# Patient Record
Sex: FEMALE | Race: WHITE | URBAN - METROPOLITAN AREA
[De-identification: names, ages, dates, MRNs, and addresses within clinical notes are randomized per-mention and may not be internally consistent; named-entity substitution may affect disease eponyms.]

---

## 2017-03-29 ENCOUNTER — OFFICE VISIT (OUTPATIENT)
Dept: FAMILY MEDICINE CLINIC | Age: 32
End: 2017-03-29

## 2017-03-29 VITALS
BODY MASS INDEX: 28.61 KG/M2 | HEIGHT: 66 IN | RESPIRATION RATE: 17 BRPM | TEMPERATURE: 96.9 F | WEIGHT: 178 LBS | OXYGEN SATURATION: 98 % | SYSTOLIC BLOOD PRESSURE: 124 MMHG | HEART RATE: 82 BPM | DIASTOLIC BLOOD PRESSURE: 90 MMHG

## 2017-03-29 DIAGNOSIS — Z91.030 BEE STING ALLERGY: Primary | ICD-10-CM

## 2017-03-29 RX ORDER — EPINEPHRINE 0.3 MG/.3ML
0.3 INJECTION SUBCUTANEOUS
Qty: 2 SYRINGE | Refills: 0 | Status: SHIPPED | OUTPATIENT
Start: 2017-03-29

## 2017-03-29 RX ORDER — EPINEPHRINE 0.3 MG/.3ML
0.3 INJECTION SUBCUTANEOUS
COMMUNITY
End: 2017-03-29 | Stop reason: SDUPTHER

## 2017-03-29 NOTE — PROGRESS NOTES
HISTORY OF PRESENT ILLNESS  Noah Rogers is a 28 y.o. female. HPI Comments: Pt comes in today for epipen refill. She says that she has a bad bee sting allergy and her epipen is . She says that she is pregnant and due this weekend. She says that this is her first baby. She denies fever, chills, sweats, N/V, chest pain, SOB, dizziness. Establish Care   Pertinent negatives include no chest pain, no abdominal pain, no headaches and no shortness of breath. Review of Systems   Constitutional: Negative for chills, diaphoresis, fever and malaise/fatigue. HENT: Negative for congestion, ear pain, hearing loss, nosebleeds and sore throat. Eyes: Negative for blurred vision, double vision, pain and redness. Respiratory: Negative for cough, hemoptysis, sputum production, shortness of breath and wheezing. Cardiovascular: Negative for chest pain, palpitations and leg swelling. Gastrointestinal: Negative for abdominal pain, blood in stool, constipation, diarrhea, nausea and vomiting. Genitourinary: Negative for dysuria and hematuria. Musculoskeletal: Negative for back pain, myalgias and neck pain. Skin: Negative for rash. Neurological: Negative for dizziness, tingling, sensory change, seizures, loss of consciousness and headaches. Endo/Heme/Allergies: Does not bruise/bleed easily. Psychiatric/Behavioral: Negative for depression, memory loss and substance abuse. The patient is not nervous/anxious. History reviewed. No pertinent past medical history. Family History   Problem Relation Age of Onset    Elevated Lipids Mother     No Known Problems Father        Past Surgical History:   Procedure Laterality Date    HX HERNIA REPAIR         Social History     Social History    Marital status: UNKNOWN     Spouse name: N/A    Number of children: N/A    Years of education: N/A     Occupational History    Not on file.      Social History Main Topics    Smoking status: Never Smoker    Smokeless tobacco: Never Used    Alcohol use No    Drug use: No    Sexual activity: Yes     Partners: Male     Other Topics Concern    Not on file     Social History Narrative    No narrative on file       Current Outpatient Prescriptions   Medication Sig Dispense Refill    EPINEPHrine (EPIPEN) 0.3 mg/0.3 mL injection 0.3 mL by IntraMUSCular route once as needed. 2 Syringe 0       Allergies   Allergen Reactions    Bee Sting [Sting, Bee] Swelling    Shellfish Derived Itching       Visit Vitals    /90 (BP 1 Location: Right arm, BP Patient Position: Sitting)    Pulse 82    Temp 96.9 °F (36.1 °C) (Oral)    Resp 17    Ht 5' 6\" (1.676 m)    Wt 178 lb (80.7 kg)    SpO2 98%    BMI 28.73 kg/m2       Physical Exam   Constitutional: She is oriented to person, place, and time. She appears well-developed and well-nourished. No distress. HENT:   Head: Normocephalic and atraumatic. Right Ear: External ear normal.   Left Ear: External ear normal.   Nose: Nose normal.   Mouth/Throat: Oropharynx is clear and moist. No oropharyngeal exudate. Eyes: Conjunctivae are normal. Pupils are equal, round, and reactive to light. Right eye exhibits no discharge. Left eye exhibits no discharge. Neck: Normal range of motion. Neck supple. No tracheal deviation present. No thyromegaly present. Cardiovascular: Normal rate, regular rhythm and normal heart sounds. Exam reveals no gallop and no friction rub. No murmur heard. Pulmonary/Chest: Effort normal and breath sounds normal. No respiratory distress. She has no wheezes. She has no rales. Abdominal: Soft. She exhibits no distension. There is no tenderness. Musculoskeletal: Normal range of motion. She exhibits no edema or tenderness. Lymphadenopathy:     She has no cervical adenopathy. Neurological: She is alert and oriented to person, place, and time. Coordination normal.   Skin: Skin is warm and dry. No rash noted. She is not diaphoretic. No erythema. Psychiatric: She has a normal mood and affect. Her behavior is normal. Judgment and thought content normal.       ASSESSMENT and PLAN  1. Bee sting allergy  - EPINEPHrine (EPIPEN) 0.3 mg/0.3 mL injection; 0.3 mL by IntraMUSCular route once as needed. Dispense: 2 Syringe; Refill: 0  - follow up as needed    Plan and reference materials were reviewed with the patient and the patient expressed understanding. Patient instructed that if symptoms/condition worsens or fails to resolve to come back to the office or go to the emergency room.     Yair Chiu

## 2017-03-29 NOTE — MR AVS SNAPSHOT
Visit Information Date & Time Provider Department Dept. Phone Encounter #  
 3/29/2017  3:30 PM Rosy Cooper, [address] [facility] [phone] [id number] Follow-up Instructions Return if symptoms worsen or fail to improve. Upcoming Health Maintenance Date Due DTaP/Tdap/Td series (1 - Tdap) 4/10/2007 PAP AKA CERVICAL CYTOLOGY 4/10/2007 INFLUENZA AGE 9 TO ADULT 2016 Allergies as of 3/29/2017  Review Complete On: 3/29/2017 By: STEPHANIE Hampton Severity Noted Reaction Type Reactions Bee Sting [Sting, Bee]  2017    Swelling Shellfish Derived  2017    Itching Current Immunizations  Never Reviewed No immunizations on file. Not reviewed this visit You Were Diagnosed With   
  
 Codes Comments Bee sting allergy    -  Primary ICD-10-CM: Z91.038 
ICD-9-CM: V15.06 Vitals BP Pulse Temp Resp Height(growth percentile) Weight(growth percentile) 124/90 (BP 1 Location: Right arm, BP Patient Position: Sitting) 82 96.9 °F (36.1 °C) (Oral) 17 5' 6\" (1.676 m) 178 lb (80.7 kg) SpO2 BMI OB Status Smoking Status 98% 28.73 kg/m2 Pregnant Never Smoker Vitals History BMI and BSA Data Body Mass Index Body Surface Area 28.73 kg/m 2 1.94 m 2 Preferred Pharmacy Pharmacy Name Phone [address], [address] [facility] [place] [phone] Your Updated Medication List  
  
   
This list is accurate as of: 3/29/17  4:08 PM.  Always use your most recent med list.  
  
  
  
  
 EPINEPHrine 0.3 mg/0.3 mL injection Commonly known as:  EPIPEN  
0.3 mL by IntraMUSCular route once as needed. Prescriptions Sent to Pharmacy Refills EPINEPHrine (EPIPEN) 0.3 mg/0.3 mL injection 0 Si.3 mL by IntraMUSCular route once as needed.   
 Class: Normal  
 Pharmacy: [facility] [address] [place] [address] [address] [place] [address] #: [phone] Route: IntraMUSCular Follow-up Instructions Return if symptoms worsen or fail to improve. Patient Instructions Allergic Reaction: Care Instructions Your Care Instructions An allergic reaction is an excessive response from your immune system to a medicine, chemical, food, insect bite, or other substance. A reaction can range from mild to life-threatening. Some people have a mild rash, hives, and itching or stomach cramps. In severe reactions, swelling of your tongue and throat can close up your airway so that you cannot breathe. Follow-up care is a key part of your treatment and safety. Be sure to make and go to all appointments, and call your doctor if you are having problems. It's also a good idea to know your test results and keep a list of the medicines you take. How can you care for yourself at home? · If you know what caused your allergic reaction, be sure to avoid it. Your allergy may become more severe each time you have a reaction. · Take an over-the-counter antihistamine, such as cetirizine (Zyrtec) or loratadine (Claritin), to treat mild symptoms. Read and follow directions on the label. Some antihistamines can make you feel sleepy. Do not give antihistamines to a child unless you have checked with your doctor first. Mild symptoms include sneezing or an itchy or runny nose; an itchy mouth; a few hives or mild itching; and mild nausea or stomach discomfort. · Do not scratch hives or a rash. Put a cold, moist towel on them or take cool baths to relieve itching. Put ice packs on hives, swelling, or insect stings for 10 to 15 minutes at a time. Put a thin cloth between the ice pack and your skin. Do not take hot baths or showers. They will make the itching worse.  
· Your doctor may prescribe a shot of epinephrine to carry with you in case you have a severe reaction. Learn how to give yourself the shot and keep it with you at all times. Make sure it is not . · Go to the emergency room every time you have a severe reaction, even if you have used your shot of epinephrine and are feeling better. Symptoms can come back after a shot. · Wear medical alert jewelry that lists your allergies. You can buy this at most drugstores. · If your child has a severe allergy, make sure that his or her teachers, babysitters, coaches, and other caregivers know about the allergy. They should have an epinephrine shot, know how and when to give it, and have a plan to take your child to the hospital. 
When should you call for help? Give an epinephrine shot if: 
· You think you are having a severe allergic reaction. · You have symptoms in more than one body area, such as mild nausea and an itchy mouth. After giving an epinephrine shot call 911, even if you feel better. Call 911 if: 
· You have symptoms of a severe allergic reaction. These may include: 
¨ Sudden raised, red areas (hives) all over your body. ¨ Swelling of the throat, mouth, lips, or tongue. ¨ Trouble breathing. ¨ Passing out (losing consciousness). Or you may feel very lightheaded or suddenly feel weak, confused, or restless. · You have been given an epinephrine shot, even if you feel better. Call your doctor now or seek immediate medical care if: 
· You have symptoms of an allergic reaction, such as: ¨ A rash or hives (raised, red areas on the skin). ¨ Itching. ¨ Swelling. ¨ Belly pain, nausea, or vomiting. Watch closely for changes in your health, and be sure to contact your doctor if: 
· You do not get better as expected. Where can you learn more? Go to http://kim-vicente.info/. Enter T907 in the search box to learn more about \"Allergic Reaction: Care Instructions. \" Current as of: 2016 Content Version: 11.2 © 1357-3390 [employer]. Care instructions adapted under license by [employer] (which disclaims liability or warranty for this information). If you have questions about a medical condition or this instruction, always ask your healthcare professional. [address] any warranty or liability for your use of this information. Introducing [facility]! Cynthia Darby introduces [employer] patient portal. Now you can access parts of your medical record, email your doctor's office, and request medication refills online. 1. In your internet browser, go to [email or web address] 2. Click on the First Time User? Click Here link in the Sign In box. You will see the New Member Sign Up page. 3. Enter your [employer] Access Code exactly as it appears below. You will not need to use this code after youve completed the sign-up process. If you do not sign up before the expiration date, you must request a new code. · [employer] Access Code: [email or web address] Expires: 6/27/2017  4:08 PM 
 
4. Enter the last four digits of your Social Security Number (xxxx) and Date of Birth (mm/dd/yyyy) as indicated and click Submit. You will be taken to the next sign-up page. 5. Create a [employer] ID. This will be your [employer] login ID and cannot be changed, so think of one that is secure and easy to remember. 6. Create a [employer] password. You can change your password at any time. 7. Enter your Password Reset Question and Answer. This can be used at a later time if you forget your password. 8. Enter your e-mail address. You will receive e-mail notification when new information is available in [address] 9. Click Sign Up. You can now view and download portions of your medical record. 10. Click the Download Summary menu link to download a portable copy of your medical information.  
 
If you have questions, please visit the Frequently Asked Questions section of the [employer]. Remember, [employer]et[employer]art is NOT to be used for urgent needs. For medical emergencies, dial 911. Now available from your iPhone and Android! Please provide this summary of care documentation to your next provider. Your primary care clinician is listed as Kolton Foley. If you have any questions after today's visit, please call [phone].

## 2017-03-29 NOTE — PATIENT INSTRUCTIONS
Allergic Reaction: Care Instructions  Your Care Instructions  An allergic reaction is an excessive response from your immune system to a medicine, chemical, food, insect bite, or other substance. A reaction can range from mild to life-threatening. Some people have a mild rash, hives, and itching or stomach cramps. In severe reactions, swelling of your tongue and throat can close up your airway so that you cannot breathe. Follow-up care is a key part of your treatment and safety. Be sure to make and go to all appointments, and call your doctor if you are having problems. It's also a good idea to know your test results and keep a list of the medicines you take. How can you care for yourself at home? · If you know what caused your allergic reaction, be sure to avoid it. Your allergy may become more severe each time you have a reaction. · Take an over-the-counter antihistamine, such as cetirizine (Zyrtec) or loratadine (Claritin), to treat mild symptoms. Read and follow directions on the label. Some antihistamines can make you feel sleepy. Do not give antihistamines to a child unless you have checked with your doctor first. Mild symptoms include sneezing or an itchy or runny nose; an itchy mouth; a few hives or mild itching; and mild nausea or stomach discomfort. · Do not scratch hives or a rash. Put a cold, moist towel on them or take cool baths to relieve itching. Put ice packs on hives, swelling, or insect stings for 10 to 15 minutes at a time. Put a thin cloth between the ice pack and your skin. Do not take hot baths or showers. They will make the itching worse. · Your doctor may prescribe a shot of epinephrine to carry with you in case you have a severe reaction. Learn how to give yourself the shot and keep it with you at all times. Make sure it is not . · Go to the emergency room every time you have a severe reaction, even if you have used your shot of epinephrine and are feeling better. Symptoms can come back after a shot. · Wear medical alert jewelry that lists your allergies. You can buy this at most drugstores. · If your child has a severe allergy, make sure that his or her teachers, babysitters, coaches, and other caregivers know about the allergy. They should have an epinephrine shot, know how and when to give it, and have a plan to take your child to the hospital.  When should you call for help? Give an epinephrine shot if:  · You think you are having a severe allergic reaction. · You have symptoms in more than one body area, such as mild nausea and an itchy mouth. After giving an epinephrine shot call 911, even if you feel better. Call 911 if:  · You have symptoms of a severe allergic reaction. These may include:  ¨ Sudden raised, red areas (hives) all over your body. ¨ Swelling of the throat, mouth, lips, or tongue. ¨ Trouble breathing. ¨ Passing out (losing consciousness). Or you may feel very lightheaded or suddenly feel weak, confused, or restless. · You have been given an epinephrine shot, even if you feel better. Call your doctor now or seek immediate medical care if:  · You have symptoms of an allergic reaction, such as:  ¨ A rash or hives (raised, red areas on the skin). ¨ Itching. ¨ Swelling. ¨ Belly pain, nausea, or vomiting. Watch closely for changes in your health, and be sure to contact your doctor if:  · You do not get better as expected. Where can you learn more? Go to http://kim-vicente.info/. Enter Z508 in the search box to learn more about \"Allergic Reaction: Care Instructions. \"  Current as of: February 12, 2016  Content Version: 11.2  © 2221-3940 "[employer]. Care instructions adapted under license by [employer] (which disclaims liability or warranty for this information).  If you have questions about a medical condition or this instruction, always ask your healthcare professional. Annie Galeana disclaims any warranty or liability for your use of this information.

## 2017-07-18 ENCOUNTER — OFFICE VISIT (OUTPATIENT)
Dept: FAMILY MEDICINE CLINIC | Age: 32
End: 2017-07-18

## 2017-07-18 VITALS
BODY MASS INDEX: 24.3 KG/M2 | SYSTOLIC BLOOD PRESSURE: 109 MMHG | HEIGHT: 66 IN | RESPIRATION RATE: 16 BRPM | DIASTOLIC BLOOD PRESSURE: 78 MMHG | OXYGEN SATURATION: 98 % | WEIGHT: 151.2 LBS | HEART RATE: 86 BPM | TEMPERATURE: 96.8 F

## 2017-07-18 DIAGNOSIS — Z11.1 ENCOUNTER FOR PPD TEST: Primary | ICD-10-CM

## 2017-07-18 NOTE — PROGRESS NOTES
Joseline Reyes is a 33 y.o.  female and presents with employment physical trying to determine she is free of any communicable disease. I told her given no other direction, I cannot work her up for all communicable diseases out there. She agreed to just do a PPD. No sign or symptom, hx of any chronic communicable disease. Chief Complaint   Patient presents with    Physical     Subjective: Additional Concerns: none     Current Outpatient Prescriptions   Medication Sig Dispense Refill    EPINEPHrine (EPIPEN) 0.3 mg/0.3 mL injection 0.3 mL by IntraMUSCular route once as needed. 2 Syringe 0     Allergies   Allergen Reactions    Bee Sting [Sting, Bee] Swelling    Shellfish Derived Itching     No past medical history on file.   Past Surgical History:   Procedure Laterality Date    HX HERNIA REPAIR       Family History   Problem Relation Age of Onset    Elevated Lipids Mother     No Known Problems Father      Social History   Substance Use Topics    Smoking status: Never Smoker    Smokeless tobacco: Never Used    Alcohol use No     ROS     General: negative for - chills, fatigue, fever, weight change  Resp: negative for - cough, shortness of breath or wheezing  CV: negative for - chest pain, edema or palpitations    Objective:  Vitals:    07/18/17 1353   BP: 109/78   Pulse: 86   Resp: 16   Temp: 96.8 °F (36 °C)   TempSrc: Oral   SpO2: 98%   Weight: 151 lb 3.2 oz (68.6 kg)   Height: 5' 6\" (1.676 m)   PainSc:   0 - No pain     PE    Alert, well appearing, and in no distress, oriented to person, place, and time and normal appearing weight  Mental status - alert, oriented to person, place, and time, normal mood, behavior, speech, dress, motor activity, and thought processes  Chest - clear to auscultation, no wheezes, rales or rhonchi, symmetric air entry  Heart - normal rate, regular rhythm, normal S1, S2, no murmurs, rubs, clicks or gallops  Extremities - peripheral pulses normal, no pedal edema, no clubbing or cyanosis    LABS   No visits with results within 6 Month(s) from this visit. Latest known visit with results is:    Hospital Outpatient Visit on 10/22/2013   Component Date Value Ref Range Status    Hepatitis B surface Ab 10/22/2013 >100.00  >1.00 Index Final    Hep B surface Ab Interp. 10/22/2013 POSITIVE  POSITIVE Final    Hep B surface Ab comment 10/22/2013    An index Value of 1.00 is equivalent to 10 mIU/mL. Samples with an Index Value of 1.00 or greater are considered reactive (protective immunity) in accordance with the CDC guidelines. Final    Mumps Abs, IgG 10/22/2013 1.86* 0.00 - 0.90 index Final    Comment: (NOTE)                                                          Negative         <0.91                                                          Equivocal  0.91 - 1.09                                                          Positive         >1.09                           Presence of antibodies to Mumps is presumptive evidence                           of immunity except when active infection is suspected. Performed At: [address] [id number]                           Bonita Shipman MD [id number]   Nita Koroma, IgG Ql. 10/22/2013 IMMUNE  IMMUNE Final    Rubeola Ab, IgG 10/22/2013 2.76* 0.00 - 0.90 index Final    Comment: (NOTE)                                                           Negative        <0.91                                                           Equivocal 0.91 - 1.09                                                           Positive        >1.09                           Presence of antibodies to Rubeola is presumptive evidence                           of immunity except when active infection is suspected.                            Performed At: [address] [id number] Ruthann Gallegos MD [id number]    V. zoster, IgG 10/22/2013 1.51  Immune >1.09 index Final    Comment: (NOTE)                                                         Nonimmune         <0.91                                                         Equivocal   0.91 - 1.09                                                         Immune            >1.09                           Performed At: [address] [id number]                           Ruthann Gallegos MD [id number]       TESTS  Results for orders placed or performed during the hospital encounter of 10/22/13   HEP B SURFACE AB   Result Value Ref Range    Hepatitis B surface Ab >100.00 >1.00 Index    Hep B surface Ab Interp. POSITIVE POSITIVE    Hep B surface Ab comment          An index Value of 1.00 is equivalent to 10 mIU/mL. Samples with an Index Value of 1.00 or greater are considered reactive (protective immunity) in accordance with the CDC guidelines. MUMPS AB, IGG   Result Value Ref Range    Mumps Abs, IgG 1.86 (H) 0.00 - 0.90 index   RUBELLA AB, IGG   Result Value Ref Range    Rubella, IgG Ql. IMMUNE IMMUNE   RUBEOLA AB, IGG   Result Value Ref Range    Rubeola Ab, IgG 2.76 (H) 0.00 - 0.90 index   VZV AB, IGG   Result Value Ref Range    V. zoster, IgG 1.51 Immune >1.09 index     Assessment/Plan:      Employment physical - Patient appears free of any communicable diseases. PPD pending. Lab review: no lab studies available for review at time of visit. I have discussed the diagnosis with the patient and the intended plan as seen in the above orders. The patient has received an after-visit summary and questions were answered concerning future plans. I have discussed medication side effects and warnings with the patient as well. I have reviewed the plan of care with the patient, accepted their input and they are in agreement with the treatment goals. F/U as needed.      Golden Hardy Igor Gutierrez MD

## 2017-07-18 NOTE — PROGRESS NOTES
Moustapha Mohan is a 33 y.o. female presents to office for work physical.       1. Have you been to the ER, urgent care clinic or hospitalized since your last visit? yes  2. Have you seen any other providers outside of [facility] since your last visit? yes  3.  Have you had a Flu shot this year? no      Health Maintenance items with a due date reviewed with patient:  Health Maintenance Due   Topic Date Due    DTaP/Tdap/Td series (1 - Tdap) 04/10/2007    PAP AKA CERVICAL CYTOLOGY  04/10/2007

## 2017-07-18 NOTE — PATIENT INSTRUCTIONS
Tuberculin Skin Test: Care Instructions  Your Care Instructions    Tuberculosis (TB) is a bacterial infection that can damage the lungs or other parts of the body. The TB skin test can tell if you have TB bacteria in your body. Many people are exposed to TB and test positive for TB bacteria in their bodies, but they don't get the disease. TB bacteria can stay in your body without making you sick. This is because your immune system can keep TB in check. Your doctor may want you to have a TB skin test if you have been in close contact with someone who has TB. Or you may need the test if you have symptoms that might be caused by TB, such as a cough that does not go away, a fever, or weight loss. You also may get the test if you are a health care worker. During the skin test, part of a TB bacterium is injected under your skin. The test will feel like a skin prick. If you have TB bacteria in your body, a firm red bump will form at the shot site within 2 days. If the test shows that you are infected with TB (positive), your doctor probably will order more tests. A TB-positive skin test can't tell when you became infected with TB. And it can't tell whether the infection can be passed to others. Follow-up care is a key part of your treatment and safety. Be sure to make and go to all appointments, and call your doctor if you are having problems. It's also a good idea to know your test results and keep a list of the medicines you take. How can you care for yourself at home? · Do not scratch the test site. Scratching it may cause redness or swelling. This could affect the test results. · To ease itching, put a cold washcloth on the site. Then pat the site dry. · Do not cover the test site with a bandage or other dressing. · Go back to your doctor's office or hospital to have the test read on the follow-up date. This must be done between 48 and 72 hours after you get the shot. When should you call for help?   Watch closely for changes in your health, and be sure to contact your doctor if:  · You did not have your TB skin test checked by your doctor. · You have a fever or swelling in your arm. · You do not get better as expected. Where can you learn more? Go to http://kim-vicente.info/. Enter ([phone] in the search box to learn more about \"Tuberculin Skin Test: Care Instructions. \"  Current as of: March 3, 2017  Content Version: 11.3  © 9518-2440 [employer]. Care instructions adapted under license by [employer] (which disclaims liability or warranty for this information). If you have questions about a medical condition or this instruction, always ask your healthcare professional. [address] any warranty or liability for your use of this information.

## 2017-07-20 ENCOUNTER — CLINICAL SUPPORT (OUTPATIENT)
Dept: FAMILY MEDICINE CLINIC | Age: 32
End: 2017-07-20

## 2017-07-20 DIAGNOSIS — Z11.1 ENCOUNTER FOR PPD SKIN TEST READING: Primary | ICD-10-CM

## 2017-07-20 LAB
MM INDURATION POC: 0 MM (ref 0–5)
PPD POC: NORMAL NEGATIVE

## 2017-07-20 NOTE — PROGRESS NOTES
PPD Reading Note  [facility] Result: 0 mm induration.   Interpretation: neg  If test not read within 48-72 hours of initial placement, patient advised to repeat in other arm 1-3 weeks after this test.  Allergic reaction: no